# Patient Record
Sex: FEMALE | Race: WHITE | NOT HISPANIC OR LATINO | Employment: OTHER | URBAN - METROPOLITAN AREA
[De-identification: names, ages, dates, MRNs, and addresses within clinical notes are randomized per-mention and may not be internally consistent; named-entity substitution may affect disease eponyms.]

---

## 2024-04-16 ENCOUNTER — LAB (OUTPATIENT)
Dept: LAB | Facility: LAB | Age: 79
End: 2024-04-16
Payer: MEDICARE

## 2024-04-16 DIAGNOSIS — D64.9 ANEMIA, UNSPECIFIED: ICD-10-CM

## 2024-04-16 DIAGNOSIS — E55.9 VITAMIN D DEFICIENCY, UNSPECIFIED: ICD-10-CM

## 2024-04-16 DIAGNOSIS — I10 ESSENTIAL (PRIMARY) HYPERTENSION: Primary | ICD-10-CM

## 2024-04-16 DIAGNOSIS — E03.9 HYPOTHYROIDISM, UNSPECIFIED: ICD-10-CM

## 2024-04-16 LAB
25(OH)D3 SERPL-MCNC: 28 NG/ML (ref 30–100)
ANION GAP SERPL CALC-SCNC: 12 MMOL/L (ref 10–20)
APPEARANCE UR: ABNORMAL
BACTERIA #/AREA URNS AUTO: ABNORMAL /HPF
BASOPHILS # BLD AUTO: 0.03 X10*3/UL (ref 0–0.1)
BASOPHILS NFR BLD AUTO: 0.6 %
BILIRUB UR STRIP.AUTO-MCNC: NEGATIVE MG/DL
BUN SERPL-MCNC: 10 MG/DL (ref 6–23)
CA-I BLD-SCNC: 1.24 MMOL/L (ref 1.1–1.33)
CALCIUM SERPL-MCNC: 9.1 MG/DL (ref 8.6–10.3)
CHLORIDE SERPL-SCNC: 106 MMOL/L (ref 98–107)
CHOLEST SERPL-MCNC: 182 MG/DL (ref 0–199)
CHOLESTEROL/HDL RATIO: 2.8
CO2 SERPL-SCNC: 27 MMOL/L (ref 21–32)
COLOR UR: YELLOW
CREAT SERPL-MCNC: 1.04 MG/DL (ref 0.5–1.05)
EGFRCR SERPLBLD CKD-EPI 2021: 54 ML/MIN/1.73M*2
EOSINOPHIL # BLD AUTO: 0.17 X10*3/UL (ref 0–0.4)
EOSINOPHIL NFR BLD AUTO: 3.6 %
ERYTHROCYTE [DISTWIDTH] IN BLOOD BY AUTOMATED COUNT: 12.9 % (ref 11.5–14.5)
GLUCOSE SERPL-MCNC: 105 MG/DL (ref 74–99)
GLUCOSE UR STRIP.AUTO-MCNC: NEGATIVE MG/DL
HCT VFR BLD AUTO: 45.1 % (ref 36–46)
HDLC SERPL-MCNC: 64.3 MG/DL
HGB BLD-MCNC: 14.5 G/DL (ref 12–16)
IMM GRANULOCYTES # BLD AUTO: 0 X10*3/UL (ref 0–0.5)
IMM GRANULOCYTES NFR BLD AUTO: 0 % (ref 0–0.9)
KETONES UR STRIP.AUTO-MCNC: NEGATIVE MG/DL
LDLC SERPL CALC-MCNC: 96 MG/DL
LEUKOCYTE ESTERASE UR QL STRIP.AUTO: ABNORMAL
LYMPHOCYTES # BLD AUTO: 1.87 X10*3/UL (ref 0.8–3)
LYMPHOCYTES NFR BLD AUTO: 40.1 %
MCH RBC QN AUTO: 30.3 PG (ref 26–34)
MCHC RBC AUTO-ENTMCNC: 32.2 G/DL (ref 32–36)
MCV RBC AUTO: 94 FL (ref 80–100)
MONOCYTES # BLD AUTO: 0.44 X10*3/UL (ref 0.05–0.8)
MONOCYTES NFR BLD AUTO: 9.4 %
MUCOUS THREADS #/AREA URNS AUTO: ABNORMAL /LPF
NEUTROPHILS # BLD AUTO: 2.15 X10*3/UL (ref 1.6–5.5)
NEUTROPHILS NFR BLD AUTO: 46.3 %
NITRITE UR QL STRIP.AUTO: NEGATIVE
NON HDL CHOLESTEROL: 118 MG/DL (ref 0–149)
NRBC BLD-RTO: 0 /100 WBCS (ref 0–0)
PH UR STRIP.AUTO: 6 [PH]
PLATELET # BLD AUTO: 296 X10*3/UL (ref 150–450)
POTASSIUM SERPL-SCNC: 4.6 MMOL/L (ref 3.5–5.3)
PROT UR STRIP.AUTO-MCNC: NEGATIVE MG/DL
RBC # BLD AUTO: 4.78 X10*6/UL (ref 4–5.2)
RBC # UR STRIP.AUTO: NEGATIVE /UL
RBC #/AREA URNS AUTO: ABNORMAL /HPF
SODIUM SERPL-SCNC: 140 MMOL/L (ref 136–145)
SP GR UR STRIP.AUTO: 1.02
SQUAMOUS #/AREA URNS AUTO: ABNORMAL /HPF
TRIGL SERPL-MCNC: 111 MG/DL (ref 0–149)
TSH SERPL-ACNC: 2.06 MIU/L (ref 0.44–3.98)
UROBILINOGEN UR STRIP.AUTO-MCNC: <2 MG/DL
VLDL: 22 MG/DL (ref 0–40)
WBC # BLD AUTO: 4.7 X10*3/UL (ref 4.4–11.3)
WBC #/AREA URNS AUTO: ABNORMAL /HPF

## 2024-04-16 PROCEDURE — 82306 VITAMIN D 25 HYDROXY: CPT

## 2024-04-16 PROCEDURE — 85025 COMPLETE CBC W/AUTO DIFF WBC: CPT

## 2024-04-16 PROCEDURE — 36415 COLL VENOUS BLD VENIPUNCTURE: CPT

## 2024-04-16 PROCEDURE — 80061 LIPID PANEL: CPT

## 2024-04-16 PROCEDURE — 84443 ASSAY THYROID STIM HORMONE: CPT

## 2024-04-16 PROCEDURE — 82330 ASSAY OF CALCIUM: CPT

## 2024-04-16 PROCEDURE — 80048 BASIC METABOLIC PNL TOTAL CA: CPT

## 2024-04-16 PROCEDURE — 81001 URINALYSIS AUTO W/SCOPE: CPT

## 2024-07-15 ENCOUNTER — APPOINTMENT (OUTPATIENT)
Dept: RADIOLOGY | Facility: HOSPITAL | Age: 79
End: 2024-07-15
Payer: MEDICARE

## 2024-07-15 ENCOUNTER — HOSPITAL ENCOUNTER (EMERGENCY)
Facility: HOSPITAL | Age: 79
Discharge: HOME | End: 2024-07-15
Payer: MEDICARE

## 2024-07-15 ENCOUNTER — APPOINTMENT (OUTPATIENT)
Dept: VASCULAR MEDICINE | Facility: HOSPITAL | Age: 79
End: 2024-07-15
Payer: MEDICARE

## 2024-07-15 VITALS
SYSTOLIC BLOOD PRESSURE: 138 MMHG | TEMPERATURE: 99.1 F | OXYGEN SATURATION: 96 % | RESPIRATION RATE: 18 BRPM | BODY MASS INDEX: 30.73 KG/M2 | DIASTOLIC BLOOD PRESSURE: 67 MMHG | WEIGHT: 180 LBS | HEART RATE: 76 BPM | HEIGHT: 64 IN

## 2024-07-15 DIAGNOSIS — M79.605 PAIN IN LEFT LEG: ICD-10-CM

## 2024-07-15 DIAGNOSIS — S99.922A INJURY OF LEFT FOOT, INITIAL ENCOUNTER: Primary | ICD-10-CM

## 2024-07-15 PROBLEM — K21.9 GERD (GASTROESOPHAGEAL REFLUX DISEASE): Status: ACTIVE | Noted: 2017-04-19

## 2024-07-15 PROBLEM — J44.9 COPD (CHRONIC OBSTRUCTIVE PULMONARY DISEASE) (MULTI): Status: ACTIVE | Noted: 2017-04-19

## 2024-07-15 PROBLEM — K44.9 DIAPHRAGMATIC HERNIA: Status: ACTIVE | Noted: 2023-03-22

## 2024-07-15 PROBLEM — K57.30 DIVERTICULAR DISEASE OF COLON: Status: ACTIVE | Noted: 2023-03-22

## 2024-07-15 PROBLEM — L28.0 LICHEN SIMPLEX CHRONICUS: Status: ACTIVE | Noted: 2022-12-06

## 2024-07-15 PROBLEM — F32.A DEPRESSION: Status: ACTIVE | Noted: 2017-04-19

## 2024-07-15 PROBLEM — K25.9 GASTRIC ULCER WITHOUT HEMORRHAGE, PERFORATION, OR OBSTRUCTION: Status: ACTIVE | Noted: 2023-03-21

## 2024-07-15 PROCEDURE — 99284 EMERGENCY DEPT VISIT MOD MDM: CPT | Mod: 25

## 2024-07-15 PROCEDURE — 73630 X-RAY EXAM OF FOOT: CPT | Mod: LEFT SIDE | Performed by: RADIOLOGY

## 2024-07-15 PROCEDURE — 93971 EXTREMITY STUDY: CPT

## 2024-07-15 PROCEDURE — 73630 X-RAY EXAM OF FOOT: CPT | Mod: LT

## 2024-07-15 PROCEDURE — 93971 EXTREMITY STUDY: CPT | Performed by: STUDENT IN AN ORGANIZED HEALTH CARE EDUCATION/TRAINING PROGRAM

## 2024-07-15 RX ORDER — ALPRAZOLAM 0.5 MG/1
0.5 TABLET ORAL AS NEEDED
COMMUNITY

## 2024-07-15 RX ORDER — ESTRADIOL 1 MG/1
1 TABLET ORAL DAILY
COMMUNITY

## 2024-07-15 RX ORDER — MEDROXYPROGESTERONE ACETATE 2.5 MG/1
2.5 TABLET ORAL
COMMUNITY

## 2024-07-15 RX ORDER — SOD SULF/POT CHLORIDE/MAG SULF 1.479 G
TABLET ORAL
COMMUNITY
Start: 2023-03-21 | End: 2024-07-17 | Stop reason: HOSPADM

## 2024-07-15 RX ORDER — ALBUTEROL SULFATE 90 UG/1
2 AEROSOL, METERED RESPIRATORY (INHALATION)
COMMUNITY

## 2024-07-15 RX ORDER — BUPROPION HYDROCHLORIDE 150 MG/1
150 TABLET, EXTENDED RELEASE ORAL 2 TIMES DAILY
COMMUNITY

## 2024-07-15 RX ORDER — OMEPRAZOLE 40 MG/1
40 CAPSULE, DELAYED RELEASE ORAL
COMMUNITY

## 2024-07-15 RX ORDER — TRAMADOL HYDROCHLORIDE 50 MG/1
TABLET ORAL
COMMUNITY

## 2024-07-15 ASSESSMENT — COLUMBIA-SUICIDE SEVERITY RATING SCALE - C-SSRS
6. HAVE YOU EVER DONE ANYTHING, STARTED TO DO ANYTHING, OR PREPARED TO DO ANYTHING TO END YOUR LIFE?: NO
1. IN THE PAST MONTH, HAVE YOU WISHED YOU WERE DEAD OR WISHED YOU COULD GO TO SLEEP AND NOT WAKE UP?: NO
2. HAVE YOU ACTUALLY HAD ANY THOUGHTS OF KILLING YOURSELF?: NO

## 2024-07-15 NOTE — ED PROVIDER NOTES
HPI   Chief Complaint   Patient presents with    Foot Injury     Pt arrives private auto from home. States stubbed her left foot on a chair this AM (0300). States increased pain/swelling to left foot. Pt states pain going all the way up her left leg into groin now.        HPI  HPI: This is 79 y.o. female who presents to the ER complaining of a left foot injury.  Patient states that she hit the top of her left foot on a chair this morning at 3 AM, about 12 hours prior to arrival.  She states that she has had pain and swelling of the foot since then.  She states that she also noticed pain that radiated up into her groin after getting up out of a chair earlier, this has largely resolved, but patient states that she was concerned about a blood clot due to pain that went up her leg.  She currently reports pain over the dorsal distal foot, pain with ambulation.  She states that she took Tylenol and aspirin at home which did significantly improve her pain, and she states her pain is mild in the ED.  Declining any pain medications.  Denies numbness, tingling, or weakness to the extremity.  No wounds or bleeding.  No chest pain or shortness of breath.  No fevers or chills.  No other injury sustained, did not fully fall to the ground, no head strike, no pain in any other body part.  No other complaints or symptoms voiced.    ROS:  General: No decreased responsiveness, no fever, chills  Neuro: no numbness or tingling  ENMT: No nosebleed  Eyes: No discharge or redness  Skel: + left foot and leg pain, no neck or back pain  Cardiac: No chest pain   Resp: No shortness of breath  GI: No abdominal pain  Skin: no rash or wounds, no erythema  Heme: no bleeding or petechiae    PMH: COPD, GERD, PUD, OA, anxiety  Social History: + smoker, no EtOH, no drugs    Physical Exam:  General: Vital signs stable, Pt is alert, no acute distress  Eyes: Conjunctiva normal, EOMs intact  HENMT: Normocephalic, atraumatic.  Moist mucous membranes.    Resp: Respiratory effort is normal, no retractions, no stridor. CTAB.  CV: Heart is regular rate and rhythm.   Abd: Soft and nontender.  Skin: Skin is warm and dry.  Skel: full range of motion of upper and lower extremities. No midline tenderness over the cervical thoracic or lumbar spine.  LLE: +mild edema over the dorsal distal foot, faint ecchymosis and mild tenderness over this area. Very mild edema into the toes.  Edema does not extend into the proximal foot, ankle, or more proximal LLE.  There is no tenderness throughout the remainder of the LLE, nontender over the toes, ankle, tib-fib, calf, knee, popliteal space, or posterior or anterior thigh.  Nontender over the inguinal region, pelvis, midline spine, or lumbar paraspinal musculature.  Intact full active range of motion of the digits, ankle, knee, and hip without pain. The patella tracks normally. Achilles intact.  Able to wiggle toes. There is no warmth or erythema, no evidence of cellulitis or intra-articular infection.  Compartments are soft to palpation. Skin is intact, no wounds or bleeding. Is neurovascularly intact distally, 2+ DP pulses, cap refill <2 sec, warm and well perfused, sensation intact and equal throughout the BLE.   Neuro: Normal gait, no motor or sensory changes.  Psych: Alert and oriented ×3, judgment is appropriate, normal mood and affect         [place] Coma Scale Score: 15                     Patient History   History reviewed. No pertinent past medical history.  History reviewed. No pertinent surgical history.  No family history on file.  Social History     Tobacco Use    Smoking status: Not on file    Smokeless tobacco: Not on file   Substance Use Topics    Alcohol use: Not on file    Drug use: Not on file       Physical Exam   ED Triage Vitals [07/15/24 1536]   Temperature Heart Rate Respirations BP   36.7 °C (98.1 °F) 84 20 --      Pulse Ox Temp src Heart Rate Source Patient Position   96 % -- -- --      BP Location FiO2 (%)      -- --       Physical Exam    ED Course & MDM   Diagnoses as of 07/15/24 1652   Injury of left foot, initial encounter       Medical Decision Making  ED course / MDM     Summary:  Patient presented with a left foot injury.  Had pain that radiated up the leg earlier, which is now resolved.  Vital signs stable, patient is very well-appearing.  Ambulates unassisted, no acute distress.  On exam, there is some edema, tenderness, and faint ecchymosis over the distal dorsal foot, no other areas of tenderness, edema does not extend proximally.  Able to flex and extend all joints of the LLE without significant pain.  Extremity is neurovascularly intact distally.  Discussed that have very low suspicion for DVT given her injury and her lack of pain or tenderness outside of the localized area of the dorsal distal foot, however patient still requesting ultrasound, which was ordered.  She declined any pain medications in the ED.  X-ray of the left foot shows dorsal soft tissue swelling, no acute fracture or dislocation.  No osseous abnormality.  Ultrasound shows no DVT, unable to visualize peroneal vein due to body habitus. Results and differential were discussed in detail with the patient.  She feels reassured with normal imaging.  She is eager for discharge.  She was given an Ace wrap and a postop shoe in the ED.  Able to ambulate without difficulty.  Symptoms likely due to a contusion.  Discussed if she has continued pain throughout the remainder of the extremity or experiences swelling throughout the rest of the extremity, she should return to the ED and may need a repeat ultrasound the next 1 week.  Discussed the option for lab work, patient agrees this is not indicated at this time.  Stable for discharge with outpatient PCP follow-up. Patient was given strict return precautions, understands reasons to return to the ED. Also discussed supportive care instructions. I expressed the importance of outpatient follow up with  their PCP. All questions were answered, patient expressed understanding and stated that they would comply.    Impression:  1. See diagnosis    Plan: Homegoing. I discussed the differential, results, and discharge plan with the patient and family/friend/caregiver. Patient was advised to follow up with PCP or recommended provider in 2-3 days for another evaluation and exam. I emphasized the importance of follow-up with the physician I referred them to in the timeframe recommended.  I explained reasons for the patient to return to the Emergency Department. They agreed that if they feel their condition is worsening,  if symptoms change, get worse, new symptoms develop prior to follow up, or if they have any other concern they should call 911 immediately for further assistance. If there is no improvement in symptoms in the next 24 hours they are advised to return for further evaluation and exam. I also explained the plan and treatment course. We also discussed medications that were prescribed including common side effects and interactions. The patient was advised to abstain from driving, operating heavy machinery, or making significant decisions while taking medications such as opiates and muscle relaxers that may impair this. I gave the patient an opportunity to ask all questions they had and answered all of them accordingly. They understand return precautions and discharge instructions. Patient and family/friend/caregiver/guardian is in agreement with plan, treatment course, and follow up and states verbally that they will comply.     Disposition: Discharge    This note has been transcribed using voice recognition and may contain grammatical errors, misplaced words, incorrect words, incorrect phrases or other errors.   Procedure  Procedures     Marlena Acosta PA-C  07/15/24 9298

## 2024-07-16 ASSESSMENT — ENCOUNTER SYMPTOMS
VOMITING: 0
DIARRHEA: 0
RHINORRHEA: 0
HEADACHES: 1
SORE THROAT: 0
NECK PAIN: 1
COUGH: 0
ABDOMINAL PAIN: 1

## 2024-07-17 ENCOUNTER — CLINICAL SUPPORT (OUTPATIENT)
Dept: AUDIOLOGY | Facility: CLINIC | Age: 79
End: 2024-07-17
Payer: MEDICARE

## 2024-07-17 ENCOUNTER — OFFICE VISIT (OUTPATIENT)
Dept: OTOLARYNGOLOGY | Facility: CLINIC | Age: 79
End: 2024-07-17
Payer: MEDICARE

## 2024-07-17 VITALS
OXYGEN SATURATION: 99 % | SYSTOLIC BLOOD PRESSURE: 150 MMHG | RESPIRATION RATE: 16 BRPM | HEART RATE: 79 BPM | DIASTOLIC BLOOD PRESSURE: 81 MMHG | WEIGHT: 181 LBS | HEIGHT: 65 IN | BODY MASS INDEX: 30.16 KG/M2

## 2024-07-17 DIAGNOSIS — L29.9 EAR ITCHING: Primary | ICD-10-CM

## 2024-07-17 DIAGNOSIS — H92.01 OTALGIA, RIGHT: ICD-10-CM

## 2024-07-17 DIAGNOSIS — H90.3 BILATERAL SENSORINEURAL HEARING LOSS: ICD-10-CM

## 2024-07-17 DIAGNOSIS — H90.3 SENSORINEURAL HEARING LOSS (SNHL), BILATERAL: Primary | ICD-10-CM

## 2024-07-17 PROCEDURE — 1159F MED LIST DOCD IN RCRD: CPT | Performed by: STUDENT IN AN ORGANIZED HEALTH CARE EDUCATION/TRAINING PROGRAM

## 2024-07-17 PROCEDURE — 92550 TYMPANOMETRY & REFLEX THRESH: CPT | Performed by: AUDIOLOGIST

## 2024-07-17 PROCEDURE — 1036F TOBACCO NON-USER: CPT | Performed by: STUDENT IN AN ORGANIZED HEALTH CARE EDUCATION/TRAINING PROGRAM

## 2024-07-17 PROCEDURE — 99203 OFFICE O/P NEW LOW 30 MIN: CPT | Performed by: STUDENT IN AN ORGANIZED HEALTH CARE EDUCATION/TRAINING PROGRAM

## 2024-07-17 PROCEDURE — 1160F RVW MEDS BY RX/DR IN RCRD: CPT | Performed by: STUDENT IN AN ORGANIZED HEALTH CARE EDUCATION/TRAINING PROGRAM

## 2024-07-17 PROCEDURE — 1126F AMNT PAIN NOTED NONE PRSNT: CPT | Performed by: STUDENT IN AN ORGANIZED HEALTH CARE EDUCATION/TRAINING PROGRAM

## 2024-07-17 PROCEDURE — 92557 COMPREHENSIVE HEARING TEST: CPT | Performed by: AUDIOLOGIST

## 2024-07-17 PROCEDURE — 99213 OFFICE O/P EST LOW 20 MIN: CPT | Performed by: STUDENT IN AN ORGANIZED HEALTH CARE EDUCATION/TRAINING PROGRAM

## 2024-07-17 SDOH — ECONOMIC STABILITY: FOOD INSECURITY: WITHIN THE PAST 12 MONTHS, THE FOOD YOU BOUGHT JUST DIDN'T LAST AND YOU DIDN'T HAVE MONEY TO GET MORE.: NEVER TRUE

## 2024-07-17 SDOH — ECONOMIC STABILITY: FOOD INSECURITY: WITHIN THE PAST 12 MONTHS, YOU WORRIED THAT YOUR FOOD WOULD RUN OUT BEFORE YOU GOT MONEY TO BUY MORE.: NEVER TRUE

## 2024-07-17 ASSESSMENT — LIFESTYLE VARIABLES
AUDIT-C TOTAL SCORE: 1
SKIP TO QUESTIONS 9-10: 1
HOW MANY STANDARD DRINKS CONTAINING ALCOHOL DO YOU HAVE ON A TYPICAL DAY: 1 OR 2
HOW OFTEN DO YOU HAVE A DRINK CONTAINING ALCOHOL: MONTHLY OR LESS
HOW OFTEN DO YOU HAVE SIX OR MORE DRINKS ON ONE OCCASION: NEVER

## 2024-07-17 ASSESSMENT — ENCOUNTER SYMPTOMS
OCCASIONAL FEELINGS OF UNSTEADINESS: 0
DEPRESSION: 1
LOSS OF SENSATION IN FEET: 0

## 2024-07-17 ASSESSMENT — PATIENT HEALTH QUESTIONNAIRE - PHQ9
SUM OF ALL RESPONSES TO PHQ9 QUESTIONS 1 AND 2: 2
2. FEELING DOWN, DEPRESSED OR HOPELESS: SEVERAL DAYS
10. IF YOU CHECKED OFF ANY PROBLEMS, HOW DIFFICULT HAVE THESE PROBLEMS MADE IT FOR YOU TO DO YOUR WORK, TAKE CARE OF THINGS AT HOME, OR GET ALONG WITH OTHER PEOPLE: NOT DIFFICULT AT ALL
1. LITTLE INTEREST OR PLEASURE IN DOING THINGS: SEVERAL DAYS

## 2024-07-17 ASSESSMENT — COLUMBIA-SUICIDE SEVERITY RATING SCALE - C-SSRS
2. HAVE YOU ACTUALLY HAD ANY THOUGHTS OF KILLING YOURSELF?: NO
6. HAVE YOU EVER DONE ANYTHING, STARTED TO DO ANYTHING, OR PREPARED TO DO ANYTHING TO END YOUR LIFE?: NO
1. IN THE PAST MONTH, HAVE YOU WISHED YOU WERE DEAD OR WISHED YOU COULD GO TO SLEEP AND NOT WAKE UP?: NO

## 2024-07-17 ASSESSMENT — PAIN SCALES - GENERAL: PAINLEVEL: 0-NO PAIN

## 2024-07-17 NOTE — PROGRESS NOTES
SUBJECTIVE  Patient ID: Camryn Amaya is a 79 y.o. female who presents for itchy ears.    The patient reports bilateral itching. The approximate duration of the patient’s complaint is at least several months. Was seen at an urgent care for earache and was prescribed Otic-HC; helped the earache but not itching. Was prescribed hydrocortisone but did not try it. Had ear itching before and was seen at ENT; was told not to use hairspray. They deny hearing loss, tinnitus, otalgia, otorrhea, and dizziness. They deny a history of prior ear surgery, noise exposure, exposure to ototoxic drugs or agents, and family history of hearing loss.    Review of Systems  Complete ROS negative except as noted above or on patient intake form and as above.    OBJECTIVE  Physical Exam  CONSTITUTIONAL: Well appearing female who appears stated age.  PSYCHIATRIC: Alert, appropriate mood and affect.  RESPIRATORY: Normal inspiration and expiration and chest wall expansion; no use of accessory muscles to breathe.  VOICE: Clear speech without hoarseness. No stridor nor stertor.  HEAD AND FACE: Symmetric facial features. No cutaneous masses or lesions were visualized.  RIGHT EAR:  Normal external ear and post auricular area, no visible lesions, external auditory canal patent, small amount of cerumen cleaned, tympanic membrane intact, no retraction, no signs of mass, effusion, or infection within the middle ear.  LEFT EAR: Normal external ear and post auricular area, no visible lesions, external auditory canal patent, tympanic membrane intact, no retraction, no signs of mass, effusion, or infection within the middle ear.    Audiology  7/17/2024.  I personally viewed the patient's audiogram from today.  This demonstrated a relatively symmetric normal sloping to moderate or severe sensorineural hearing loss bilaterally.  There is a mild dip at 1000 Hz bilaterally.  She had excellent word recognition scores, type A tympanograms, and preserved acoustic  reflexes bilaterally.    ASSESSMENT/PLAN  Diagnoses and all orders for this visit:  Bilateral sensorineural hearing loss  Ear itching      79 y.o. female presented today with main concern for ear itching.    Ear itching  The patient notes that since April 2024 she has had persistent bilateral ear itching.  On exam I appreciate no concerning findings including no infection.  I suspect that the patient has some measure of chronic eczematoid otitis externa.  She already has some hydrocortisone cream at home.  I recommended that she trial this twice daily over the next week.  She can then follow-up in 4 weeks to check on her progress.    2.  Bilateral sensorineural hearing loss  The patient on audiogram today demonstrated a moderate-severe sensorineural hearing loss in the higher frequencies.  The etiologies of hearing loss were discussed with the patient. We discussed the patient's options which include continued observation and amplification.  We discussed how there is no true treatment for hearing loss.  The patient should get a follow-up audiogram yearly.    This note was created using speech recognition transcription software. Despite proofreading, typographical errors may be present that affect the meaning of the content. Please contact my office with any questions.

## 2024-07-17 NOTE — PROGRESS NOTES
"AUDIOLOGY ADULT AUDIOMETRIC EVALUATION      Name:  Camryn Amaya  :  1945  Age:  79 y.o.  Date of Evaluation:  2024    HISTORY  Reason for visit:  otalgia  Ms. Amaya is seen 2024 at the request of Raul Little M.D. for an evaluation of hearing.      Chief complaint:    - history of right earache in April, right, treated with eardrops  - pain resolved however both ears continue to itch (started in April)    Hearing loss:  hearing loss bilaterally  Tinnitus:   denies  Otitis Media: few ear infections in life  Otologic surgical history:  denies  Dizziness/imbalance:  denies  Otalgia:  none at this time  Ear pressure/fullness:  denies  History of excessive noise exposure:  denies   Other: COPD    Hearing aid history: none         EVALUATION  Please find audiogram in \"Media\" tab (Document Type:  Audiology Report) or included at the bottom of this note.    RESULTS   Otoscopic Evaluation: clear canals bilaterally       Immittance Measures (226 Hz probe tone):   Tympanometry is consistent with normal middle ear pressure and normal tympanic membrane mobility bilaterally.       Ipsilateral acoustic reflexes (500-4000 Hz) are present for the right ear for 8404-1174 Hz (absent 500 Hz) and are present for the left ear for 500-4000 Hz.       Test technique:  standard behavioral technique via [facility] earphones.  Reliability is god.    Pure Tone Audiometry:    Right ear: Hearing sensitivity is in the normal hearing to moderate hearing loss range.   Left ear: Hearing sensitivity is in the normal hearing to moderately-severe hearing loss range.       Speech Audiometry:        Right Ear:  Speech Reception Threshold (SRT) was obtained at 15 dBHL                 Speech discrimination score was 96% in quiet when words were presented at 65 dBHL      Left Ear:  Speech Reception Threshold (SRT) was obtained at 20 dBHL                 Speech discrimination score was 88% in quiet when words were presented at 70 " dBHL    IMPRESSIONS:  Patient is expected to have communication difficulty in adverse listening environments.    Patient is expected to benefit from effective communication strategies and may benefit from devices that provide amplification and/or improve the desired sound signal over that of background noise.       RECOMMENDATIONS  Continue with ENT follow-up with Raul Little M.D.   Patient may consider a Hearing Aid Evaluation with an audiologist to discuss hearing assistive technology and services.    Reassess hearing in 1 year (or sooner if medically indicated or if there is a concern for a change in hearing).    Continue with medical follow-up as indicated.      PATIENT EDUCATION  Discussed results and recommendations with patient.  Questions were addressed and the patient was encouraged to contact our department should concerns arise.       MIKE Tuttle, CCC-A  Licensed Audiologist

## 2024-09-03 ENCOUNTER — APPOINTMENT (OUTPATIENT)
Dept: OTOLARYNGOLOGY | Facility: CLINIC | Age: 79
End: 2024-09-03
Payer: MEDICARE

## 2025-05-28 ENCOUNTER — OFFICE VISIT (OUTPATIENT)
Dept: OTOLARYNGOLOGY | Facility: CLINIC | Age: 80
End: 2025-05-28
Payer: MEDICARE

## 2025-05-28 VITALS
DIASTOLIC BLOOD PRESSURE: 81 MMHG | BODY MASS INDEX: 31.63 KG/M2 | HEIGHT: 64 IN | WEIGHT: 185.25 LBS | OXYGEN SATURATION: 95 % | RESPIRATION RATE: 20 BRPM | SYSTOLIC BLOOD PRESSURE: 181 MMHG | TEMPERATURE: 97.8 F | HEART RATE: 83 BPM

## 2025-05-28 DIAGNOSIS — H90.3 BILATERAL SENSORINEURAL HEARING LOSS: ICD-10-CM

## 2025-05-28 DIAGNOSIS — L29.9 EAR ITCHING: Primary | ICD-10-CM

## 2025-05-28 DIAGNOSIS — R42 DIZZINESS AND GIDDINESS: ICD-10-CM

## 2025-05-28 PROCEDURE — G2211 COMPLEX E/M VISIT ADD ON: HCPCS | Performed by: STUDENT IN AN ORGANIZED HEALTH CARE EDUCATION/TRAINING PROGRAM

## 2025-05-28 PROCEDURE — 1126F AMNT PAIN NOTED NONE PRSNT: CPT | Performed by: STUDENT IN AN ORGANIZED HEALTH CARE EDUCATION/TRAINING PROGRAM

## 2025-05-28 PROCEDURE — 1160F RVW MEDS BY RX/DR IN RCRD: CPT | Performed by: STUDENT IN AN ORGANIZED HEALTH CARE EDUCATION/TRAINING PROGRAM

## 2025-05-28 PROCEDURE — 1159F MED LIST DOCD IN RCRD: CPT | Performed by: STUDENT IN AN ORGANIZED HEALTH CARE EDUCATION/TRAINING PROGRAM

## 2025-05-28 PROCEDURE — 99213 OFFICE O/P EST LOW 20 MIN: CPT | Performed by: STUDENT IN AN ORGANIZED HEALTH CARE EDUCATION/TRAINING PROGRAM

## 2025-05-28 PROCEDURE — 1036F TOBACCO NON-USER: CPT | Performed by: STUDENT IN AN ORGANIZED HEALTH CARE EDUCATION/TRAINING PROGRAM

## 2025-05-28 RX ORDER — FLUOCINOLONE ACETONIDE 0.11 MG/ML
5 OIL AURICULAR (OTIC) 2 TIMES DAILY PRN
Qty: 20 ML | Refills: 3 | Status: SHIPPED | OUTPATIENT
Start: 2025-05-28

## 2025-05-28 SDOH — ECONOMIC STABILITY: FOOD INSECURITY: WITHIN THE PAST 12 MONTHS, YOU WORRIED THAT YOUR FOOD WOULD RUN OUT BEFORE YOU GOT MONEY TO BUY MORE.: NEVER TRUE

## 2025-05-28 SDOH — ECONOMIC STABILITY: FOOD INSECURITY: WITHIN THE PAST 12 MONTHS, THE FOOD YOU BOUGHT JUST DIDN'T LAST AND YOU DIDN'T HAVE MONEY TO GET MORE.: NEVER TRUE

## 2025-05-28 ASSESSMENT — ENCOUNTER SYMPTOMS
LOSS OF SENSATION IN FEET: 0
OCCASIONAL FEELINGS OF UNSTEADINESS: 0
DEPRESSION: 0

## 2025-05-28 ASSESSMENT — LIFESTYLE VARIABLES
SKIP TO QUESTIONS 9-10: 1
HOW OFTEN DO YOU HAVE SIX OR MORE DRINKS ON ONE OCCASION: NEVER
AUDIT-C TOTAL SCORE: 0
HOW OFTEN DO YOU HAVE A DRINK CONTAINING ALCOHOL: NEVER
HOW MANY STANDARD DRINKS CONTAINING ALCOHOL DO YOU HAVE ON A TYPICAL DAY: PATIENT DOES NOT DRINK

## 2025-05-28 ASSESSMENT — PAIN SCALES - GENERAL: PAINLEVEL_OUTOF10: 0-NO PAIN

## 2025-05-28 ASSESSMENT — COLUMBIA-SUICIDE SEVERITY RATING SCALE - C-SSRS
1. IN THE PAST MONTH, HAVE YOU WISHED YOU WERE DEAD OR WISHED YOU COULD GO TO SLEEP AND NOT WAKE UP?: NO
6. HAVE YOU EVER DONE ANYTHING, STARTED TO DO ANYTHING, OR PREPARED TO DO ANYTHING TO END YOUR LIFE?: NO
2. HAVE YOU ACTUALLY HAD ANY THOUGHTS OF KILLING YOURSELF?: NO

## 2025-05-28 ASSESSMENT — PATIENT HEALTH QUESTIONNAIRE - PHQ9
1. LITTLE INTEREST OR PLEASURE IN DOING THINGS: NOT AT ALL
SUM OF ALL RESPONSES TO PHQ9 QUESTIONS 1 AND 2: 0
2. FEELING DOWN, DEPRESSED OR HOPELESS: NOT AT ALL

## 2025-05-28 NOTE — PROGRESS NOTES
SUBJECTIVE  Patient ID: Camryn Amaya is a 80 y.o. female who presents for Follow-up (Itchy ears).    History 7/17/2024:  The patient reports bilateral itching. The approximate duration of the patient’s complaint is at least several months. Was seen at an urgent care for earache and was prescribed Otic-HC; helped the earache but not itching. Was prescribed hydrocortisone but did not try it. Had ear itching before and was seen at ENT; was told not to use hairspray. They deny hearing loss, tinnitus, otalgia, otorrhea, and dizziness. They deny a history of prior ear surgery, noise exposure, exposure to ototoxic drugs or agents, and family history of hearing loss.    Update 5/28/2025:  The patient notes an episode roughly 1.5 weeks ago. She reports a feeling of swaying/imbalance when she first got up. Lasted 10-15 minutes and resolved with sitting still. Was a little woozy. No other episodes since then. No noticeable change in hearing. No roaring tinnitus.  She is still noting ear itching. Hydrocortisone cream was not helpful. Working on getting a dermatology appointment.  She has not appreciated a change in hearing since last visit.    OBJECTIVE  Physical Exam  CONSTITUTIONAL: Well appearing female who appears stated age.  PSYCHIATRIC: Alert, appropriate mood and affect.  RESPIRATORY: Normal inspiration and expiration and chest wall expansion; no use of accessory muscles to breathe.  VOICE: Clear speech without hoarseness. No stridor nor stertor.  HEAD AND FACE: Symmetric facial features. No cutaneous masses or lesions were visualized.  RIGHT EAR:  Normal external ear and post auricular area, no visible lesions, external auditory canal patent, small amount of cerumen cleaned, tympanic membrane intact, no retraction, no signs of mass, effusion, or infection within the middle ear.  LEFT EAR: Normal external ear and post auricular area, no visible lesions, external auditory canal patent, tympanic membrane intact, no  "retraction, no signs of mass, effusion, or infection within the middle ear.    Audiology  7/17/2024.  I personally viewed the patient's audiogram from today.  This demonstrated a relatively symmetric normal sloping to moderate or severe sensorineural hearing loss bilaterally.  There is a mild dip at 1000 Hz bilaterally.  She had excellent word recognition scores, type A tympanograms, and preserved acoustic reflexes bilaterally.      ASSESSMENT/PLAN  Diagnoses and all orders for this visit:  Ear itching  -     fluocinolone (DermOtic) 0.01 % ear drops; Administer 5 drops into each ear 2 times a day as needed (ear itching).  Dizziness and giddiness  Bilateral sensorineural hearing loss        80 y.o. female presented today with main concern for ear itching.    Ear itching  The patient previously noted that since April 2024 she has had persistent bilateral ear itching.  On exam I appreciate no concerning findings including no infection.  I suspect that the patient has some measure of chronic eczematoid otitis externa.  She tried hydrocortisone cream without improvement.  Has also been using hydroperoxide intermittently.  I advised her against hydroperoxide but we discussed mineral/sweet oil as 1 alternative means of moisturizing the ear canal.  She was also interested in Dermotic eardrops; script sent.    2.  Bilateral sensorineural hearing loss  The patient's initial audiogram demonstrated a moderate-severe sensorineural hearing loss in the higher frequencies.  I recommended a yearly follow-up exam.    3.  Dizziness, suspected orthostatic event  The patient reports that recently on first getting up she felt \"woozy\" similar to feeling drunk.  This resolved after 10 to 15 minutes when sitting still.  She denied any vertigo nor persistent symptoms following this event.  She denied other otologic symptoms.  Symptoms to be suspicious for brief orthostatic hypotensive episodes.  I recommended that she continue to work with " her PCP regarding blood pressure as her blood pressure was quite high today.    She was initially concern for a potential disease such as Ménière disease.  I discussed why this is unlikely.    Follow-up for the above issues with audiogram later this summer.    This note was created using speech recognition transcription software. Despite proofreading, typographical errors may be present that affect the meaning of the content. Please contact my office with any questions.

## 2025-06-17 ENCOUNTER — TELEMEDICINE (OUTPATIENT)
Dept: URGENT CARE | Age: 80
End: 2025-06-17
Payer: MEDICARE

## 2025-06-17 ENCOUNTER — APPOINTMENT (OUTPATIENT)
Dept: URGENT CARE | Age: 80
End: 2025-06-17
Payer: MEDICARE

## 2025-06-17 DIAGNOSIS — R05.1 ACUTE COUGH: Primary | ICD-10-CM

## 2025-06-17 DIAGNOSIS — J44.9 CHRONIC OBSTRUCTIVE PULMONARY DISEASE, UNSPECIFIED COPD TYPE (MULTI): ICD-10-CM

## 2025-06-17 ASSESSMENT — ENCOUNTER SYMPTOMS
CARDIOVASCULAR NEGATIVE: 1
FEVER: 1
FATIGUE: 1
SHORTNESS OF BREATH: 1
COUGH: 1
PSYCHIATRIC NEGATIVE: 1
NEUROLOGICAL NEGATIVE: 1
WHEEZING: 1
MUSCULOSKELETAL NEGATIVE: 1

## 2025-06-17 NOTE — PATIENT INSTRUCTIONS
Advised that unable to adequately evaluate over virtual encounter.  Needs in person exam, CXR, Covid testing.  Patient appears stable and capable of driving self to  [facility], states that it is close to where she lives.

## 2025-06-17 NOTE — PROGRESS NOTES
Urgent Care Virtual Video Visit    Patient Location: Home  Provider Location: [facility]    I have communicated my name and active licensure. Video visit completed with realtime synchronous video/audio connection. Informed consent was obtained from the patient. Patient was made aware that my evaluation and diagnosis are limited due to the fact that we are not in the same room during the interview and that this is a virtual encounter that took place via videoconferencing. Patient verbalized understanding.     [facility]   Patient ID: Camryn Amaya is a 80 y.o. female. They present today with a chief complaint of Illness (Coughing,sore throat and yellow phlegm   Tired no energy . - Entered by patient).    History of Present Illness  C/O productive cough, Shortness of breath, wheezing, since Friday.  Had fever for 1 day.  Has history of COPD, increased use of albuterol inhaler.  Not helping that much.  Not taking anything else otc.  Does not have pulse oximeter.      Illness  Associated symptoms: cough, fatigue, fever, shortness of breath and wheezing        Past Medical History  Allergies as of 06/17/2025 - Reviewed 05/28/2025   Allergen Reaction Noted    Ibuprofen Hives 06/17/2014       Prescriptions Prior to Admission[1]     Medical History[2]    Surgical History[3]     reports that she has quit smoking. Her smoking use included cigarettes. She has never used smokeless tobacco. She reports that she does not currently use alcohol. She reports that she does not use drugs.    Review of Systems  Review of Systems   Constitutional:  Positive for fatigue and fever.   Respiratory:  Positive for cough, shortness of breath and wheezing.    Cardiovascular: Negative.    Musculoskeletal: Negative.    Skin: Negative.    Neurological: Negative.    Psychiatric/Behavioral: Negative.                                    Objective    No Vs d/t virtual encounter    Physical Exam  Exam  limited d/t virtual encounter  Alert and oriented x4  NAD  Speaking complete sentences with SOB, no audible wheeze   Procedures    Point of Care Test & Imaging Results from this visit  No results found for this visit on 06/17/25.   Imaging  No results found.    Cardiology, Vascular, and Other Imaging  No other imaging results found for the past 2 days      Diagnostic study results (if any) were reviewed by WESLEY Pickard.    Assessment/Plan   Allergies, medications, history, and pertinent labs/EKGs/Imaging reviewed by WESLEY Pickard.     Medical Decision Making  Advised that unable to adequately evaluate over virtual encounter.  Needs in person exam, CXR, Covid testing.  Patient appears stable and capable of driving self to [facility], states that it is close to where she lives.    Orders and Diagnoses  Diagnoses and all orders for this visit:  Acute cough  Chronic obstructive pulmonary disease, unspecified COPD type (Multi)      Medical Admin Record      Patient disposition: [facility]    Electronically signed by WESLEY Pickard  4:04 PM        Electronically signed by WESLEY Pickard  4:04 PM         [1] (Not in a hospital admission)   [2]   Past Medical History:  Diagnosis Date    Ear pain     Fatigue     GERD (gastroesophageal reflux disease)     HL (hearing loss)     Obesity     Sleep difficulties    [3]   Past Surgical History:  Procedure Laterality Date    EYE SURGERY

## 2025-09-02 ENCOUNTER — APPOINTMENT (OUTPATIENT)
Dept: AUDIOLOGY | Facility: CLINIC | Age: 80
End: 2025-09-02
Payer: MEDICARE

## 2025-09-02 ENCOUNTER — APPOINTMENT (OUTPATIENT)
Dept: OTOLARYNGOLOGY | Facility: CLINIC | Age: 80
End: 2025-09-02
Payer: MEDICARE